# Patient Record
Sex: FEMALE | Race: WHITE | Employment: UNEMPLOYED | URBAN - METROPOLITAN AREA
[De-identification: names, ages, dates, MRNs, and addresses within clinical notes are randomized per-mention and may not be internally consistent; named-entity substitution may affect disease eponyms.]

---
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
## 2019-03-07 ENCOUNTER — HOSPITAL ENCOUNTER (OUTPATIENT)
Dept: OCCUPATIONAL THERAPY | Age: 19
Setting detail: THERAPIES SERIES
Discharge: HOME | End: 2019-03-07
Payer: COMMERCIAL

## 2019-03-07 PROCEDURE — 97537 COMMUNITY/WORK REINTEGRATION: CPT

## 2019-03-15 ENCOUNTER — HOSPITAL ENCOUNTER (OUTPATIENT)
Dept: OCCUPATIONAL THERAPY | Age: 19
Setting detail: THERAPIES SERIES
Discharge: HOME | End: 2019-03-15
Payer: COMMERCIAL

## 2019-03-15 PROCEDURE — 97537 COMMUNITY/WORK REINTEGRATION: CPT

## 2019-03-29 ENCOUNTER — HOSPITAL ENCOUNTER (OUTPATIENT)
Dept: OCCUPATIONAL THERAPY | Age: 19
Setting detail: THERAPIES SERIES
Discharge: HOME | End: 2019-03-29
Payer: COMMERCIAL

## 2019-03-29 PROCEDURE — 97537 COMMUNITY/WORK REINTEGRATION: CPT

## 2019-04-12 ENCOUNTER — APPOINTMENT (OUTPATIENT)
Dept: OCCUPATIONAL THERAPY | Age: 19
End: 2019-04-12
Payer: COMMERCIAL

## 2019-04-12 ENCOUNTER — HOSPITAL ENCOUNTER (OUTPATIENT)
Dept: OCCUPATIONAL THERAPY | Age: 19
Setting detail: THERAPIES SERIES
Discharge: HOME | End: 2019-04-12
Payer: COMMERCIAL

## 2019-04-12 PROCEDURE — 97537 COMMUNITY/WORK REINTEGRATION: CPT

## 2019-04-12 NOTE — PROGRESS NOTES
Occupational Therapy  Occupational Therapy  Rehabilitation Daily Treatment Note    Maci Ashby  2000   [id number]      4/12/2019  12:05 PM  No current outpatient medications on file. No current facility-administered medications for this encounter. Treatment diagnosis:   Spina Bifida        Subjective:     Objective Observations related to Long Term Goals:    1. Pt will complete driving on primary and secondary roads with no intervention required for steering or braking. She was better able to maintain her kendrick when in straight streets. When going around curves to the right she maintained her kendrick. When taking curves to the left she was demonstrating decreased kendrick positioning as she was cutting the turns short and going on the yellow lines. This session she was able to identify her kendrick positioning and adjust without cues. She began driving in light traffic with a good following distance. She did tend to brake late resulting in heavy braking. She was educated to begin looking ahead and anticipate traffic lights and traffic to begin braking sooner. She only required minimal cues for managing her speed including cues for a school zone. 2.  Pt will complete manueverability course with minimal verbal cues. NA   3. Pt will drive on the expressway with no intervention required for steering or braking. NA   4. Pt will complete safe kendrick changes with no verbal cues or intervention required. She attempted two kendrick changes due to needing to change lanes to turn with cars present. She attempted to change lanes without her turn signal or checking for traffic. She was educated on proper technique for kendrick change. Pt unaware of a blind spot. Next session will demonstrate the blind spot to assist her with understanding and knowing where to look when checking her blind spot. 5.  Pt will complete stop sign procedures and appropriate turn taking with no verbal cues or intervention required. She was able to complete with no cues when no other vehicles present. Required cues for decision making when any vehicles were present at the intersection or approaching the intersection. 6.  Pt will complete unprotected left turns with oncoming traffic with no verbal cues or intervention required. She required cues for the difference between green lights and green arrows when turning left. 7.  Pt will demonstrate safe use of vehicle modifications. Specify equipment if applicable:                                                               Push/rock hand control, spinner knob with auxiliary controls. Time In: 1010    Time Out: 1200    Timed Code Treatment Minutes: 110    Total Treatment Minutes: 110    Treatment/Activity Tolerance: good    Prognosis: good    Patient Requires Follow-up: yes    Plan:   Continue per plan of care:____x_______   Jennifer Schroeder current plan:_________   Discharge:______________    Plan for Next Session: Demonstrate blind spots, attempt kendrick changes, increase traffic as able.       MALAIKA Cuba, CDRS, [facility]   Certified  Rehabilitation Specialist

## 2019-04-26 ENCOUNTER — HOSPITAL ENCOUNTER (OUTPATIENT)
Dept: OCCUPATIONAL THERAPY | Age: 19
Setting detail: THERAPIES SERIES
Discharge: HOME | End: 2019-04-26
Payer: COMMERCIAL

## 2019-04-26 ENCOUNTER — APPOINTMENT (OUTPATIENT)
Dept: OCCUPATIONAL THERAPY | Age: 19
End: 2019-04-26
Payer: COMMERCIAL

## 2019-04-26 PROCEDURE — 97537 COMMUNITY/WORK REINTEGRATION: CPT

## 2019-04-26 NOTE — PROGRESS NOTES
Occupational Therapy  Occupational Therapy  Rehabilitation Daily Treatment Note    Julienne Lesch  2000   [id number]      4/26/2019  12:05 PM  No current outpatient medications on file. No current facility-administered medications for this encounter. Treatment diagnosis:     Spina Bifida    Subjective:     Objective Observations related to Long Term Goals:    1. Pt will complete driving on primary and secondary roads with no intervention required for steering or braking. Pt demonstrated improved kendrick positioning this date as she only required cues x 2 this date. She was improved with managing her speed as she was able to maintain the flow of traffic. She was able slow down appropriately for curves and turns only requiring cues x 2. She began driving in light traffic due to improvement with kendrick positioning. She required cues for following distance. At times when traveling through green lights with vehicles approaching the intersection from the side she would slow down requiring cues to maintain her speed. 2.  Pt will complete manueverability course with minimal verbal cues. NA   3. Pt will drive on the expressway with no intervention required for steering or braking. NA   4. Pt will complete safe kendrick changes with no verbal cues or intervention required. Demonstrated her blind spot when in the parking lot before driving. She was then instructed on proper technique to change lanes including checking her blind spot. She completed one kendrick change with max cues. 5.  Pt will complete stop sign procedures and appropriate turn taking with no verbal cues or intervention required. She required cues for turn taking when other vehicles were present at the intersection. 6.  Pt will complete unprotected left turns with oncoming traffic with no verbal cues or intervention required. NA   7. Pt will demonstrate safe use of vehicle modifications.  Specify equipment if applicable: Push/rock hand control, spinner knob with auxiliary controls. Time In: 1000    Time Out: 1200    Timed Code Treatment Minutes: 120    Total Treatment Minutes: 120      Treatment/Activity Tolerance:good    Prognosis:good    Patient Requires Follow-up: yes    Plan:   Continue per plan of care:____x_______   Cornelius Stubbs current plan:_________   Discharge:______________    Plan for Next Session: Increase traffic as able, attempt parking.       MALAIKA Hinojosa, CDRS, [facility]   Certified  Rehabilitation Specialist

## 2019-05-02 ENCOUNTER — HOSPITAL ENCOUNTER (OUTPATIENT)
Dept: OCCUPATIONAL THERAPY | Age: 19
Setting detail: THERAPIES SERIES
Discharge: HOME | End: 2019-05-02
Payer: COMMERCIAL

## 2019-05-02 ENCOUNTER — APPOINTMENT (OUTPATIENT)
Dept: OCCUPATIONAL THERAPY | Age: 19
End: 2019-05-02
Payer: COMMERCIAL

## 2019-05-02 PROCEDURE — 97537 COMMUNITY/WORK REINTEGRATION: CPT

## 2019-05-02 NOTE — PROGRESS NOTES
Occupational Therapy  Occupational Therapy  Rehabilitation Daily Treatment Note    Belkis Malone  2000   [id number]      5/2/2019  12:10 PM  No current outpatient medications on file. No current facility-administered medications for this encounter. Treatment diagnosis:   Spina Bifida      Subjective:     Objective Observations related to Long Term Goals:    1. Pt will complete driving on primary and secondary roads with no intervention required for steering or braking. She was able to increase traffic today as she drove on roads with minimal to moderate traffic at times. She reacted appropriately when traveling in traffic and did not require any intervention for braking. She was able to maintain her kendrick most of the time. She did have 2 instances of over correcting her steering requiring intervention to avoid the curb. She was able to maintain her speed throughout. 2.  Pt will complete manueverability course with minimal verbal cues. She attempted parking with max cues to align with the space. She was turning the wheel sharply resulting in sharp turns and pulling onto the lines. When backing out of the spaces she required cues to go slow as she was accelerating and backing up too fast.   3.  Pt will drive on the expressway with no intervention required for steering or braking. NA   4. Pt will complete safe kendrick changes with no verbal cues or intervention required. She attempted one kendrick change to avoid parked cars. She required max cues to complete the kendrick change. 5.  Pt will complete stop sign procedures and appropriate turn taking with no verbal cues or intervention required. She required cues for decision making when other vehicles were present at the intersection. 6.  Pt will complete unprotected left turns with oncoming traffic with no verbal cues or intervention required. She required cues to look for oncoming traffic.   When the traffic passed she continued wait requiring cues to proceed through the intersection. 7.  Pt will demonstrate safe use of vehicle modifications. Specify equipment if applicable:                                                               Push/rock hand control, spinner knob with auxiliary controls. Time In: 1005    Time Out: 1205    Timed Code Treatment Minutes: 120    Total Treatment Minutes: 120    Treatment/Activity Tolerance: good    Prognosis: good    Patient Requires Follow-up: yes    Plan:   Continue per plan of care:____x_______   Godinez Cools current plan:_________   Discharge:______________    Plan for Next Session: Parking, kendrick changes.     MALAIKA Norman, CDRS, [address] [facility]   Certified  Rehabilitation Specialist

## 2019-05-09 ENCOUNTER — APPOINTMENT (OUTPATIENT)
Dept: OCCUPATIONAL THERAPY | Age: 19
End: 2019-05-09
Payer: COMMERCIAL

## 2019-05-10 ENCOUNTER — HOSPITAL ENCOUNTER (OUTPATIENT)
Dept: OCCUPATIONAL THERAPY | Age: 19
Setting detail: THERAPIES SERIES
Discharge: HOME | End: 2019-05-10
Payer: COMMERCIAL

## 2019-05-10 PROCEDURE — 97537 COMMUNITY/WORK REINTEGRATION: CPT

## 2019-05-10 NOTE — PROGRESS NOTES
Occupational Therapy  Occupational Therapy  Rehabilitation Daily Treatment Note    Magdalena Pugh  2000   [id number]      5/10/2019  12:01 PM  No current outpatient medications on file. No current facility-administered medications for this encounter. Diagnosis: Spina Bifida      Subjective: Pt was asking if her parents would be able to observe her driving. Objective Observations related to Long Term Goals:    1. Pt will complete driving on primary and secondary roads with no intervention required for steering or braking. She began driving from the hospital.  She was able to increase traffic. She required frequent cues for scanning her environment. She required cues to stop at a light that was turning yellow. She reported she did not see the light turn yellow. She also required cues to avoid garbage cans that were hanging out into the street. She required cues for speed throughout. She was braking at times when not needed requiring cues. Pt educated on decreasing unnecessary braking as it may lead to an accident. She was better able to maintain her kendrick this date as she only required a cue x 1 for kendrick positioning. 2.  Pt will complete manueverability course with minimal verbal cues. Parked in multiple parking spots requiring max cues for kendrick positioning and for technique. 3.  Pt will drive on the expressway with no intervention required for steering or braking. NA   4. Pt will complete safe kendrick changes with no verbal cues or intervention required. She completed multiple kendrick changes with max cues for technique. She also required assist with steering as she was steering out of her kendrick when looking in her side mirrors. 5.  Pt will complete stop sign procedures and appropriate turn taking with no verbal cues or intervention required. She required cues at stop signs when other vehicles were present for turn taking.   If she was unable to see down the road she was attempting to move forward to increase her vision. She was educated this is only needed when there are not 4 way stops at the intersection. 6.  Pt will complete unprotected left turns with oncoming traffic with no verbal cues or intervention required. NA   7. Pt will demonstrate safe use of vehicle modifications.  Specify equipment if applicable:                                                               Push/rock hand control, spinner knob with auxiliary controls     Assessment:     Time In: 1010    Time Out: 1155    Timed Code Treatment Minutes: 105    Total Treatment Minutes: 105    Treatment/Activity Tolerance: good    Prognosis: good    Patient Requires Follow-up: yes    Plan:   Continue per plan of care:_____x______   Charlotte Teran current plan:_________   Discharge:______________    Plan for Next Session: continue with kendrick changes    HUGH Quinonez/LOI, [facility], [facility]   Certified  Rehabilitation Specialist

## 2019-05-16 ENCOUNTER — APPOINTMENT (OUTPATIENT)
Dept: OCCUPATIONAL THERAPY | Age: 19
End: 2019-05-16
Payer: COMMERCIAL

## 2019-05-23 ENCOUNTER — HOSPITAL ENCOUNTER (OUTPATIENT)
Dept: OCCUPATIONAL THERAPY | Age: 19
Setting detail: THERAPIES SERIES
Discharge: HOME | End: 2019-05-23
Payer: COMMERCIAL

## 2019-05-23 PROCEDURE — 97537 COMMUNITY/WORK REINTEGRATION: CPT

## 2019-05-23 NOTE — PROGRESS NOTES
Occupational Therapy  Occupational Therapy  Rehabilitation Daily Treatment Note    Dasha Bernard  2000   [id number]      5/23/2019  12:16 PM  No current outpatient medications on file. No current facility-administered medications for this encounter. Diagnosis: Spina Bifida      Subjective: Pt reports being finished with school and is asking if she can come twice a week. Objective Observations related to Long Term Goals:    1. Pt will complete driving on primary and secondary roads with no intervention required for steering or braking. She began driving in minimal traffic and progressed to moderate traffic. Pt became more anxious with increased traffic so then progressed back to less traffic. Discussed we will continue to increase her traffic as able. She required frequent cues throughout for kendrick positioning as she was driving toward the right side of the lanes. She appeared to be up high enough to see over the steering wheel as she was sitting on her new cushion. She was beginning to brake late resulting in heavy braking at times. She was educated on beginning to brake sooner in traffic. She was frequently using the brake to control her speed even when braking was not necessary. Pt educated on letting the vehicle coast to avoid unnecessary braking with sudden changes in speed to avoid accidents. 2.  Pt will complete manueverability course with minimal verbal cues. Completed the forward portion of the course only this date. Concentrated on maneuvering the vehicle in smaller spaces with controlling her steering and speed. She hit the cone to the left as she was attempting to turn too wide to steer to the side of the point cone. She was able to  her back bumper but required assist with the front bumper. 3.  Pt will drive on the expressway with no intervention required for steering or braking. NA   4.   Pt will complete safe kendrick changes with no verbal cues or intervention required. She completed with max cues in minimal traffic. 5.  Pt will complete stop sign procedures and appropriate turn taking with no verbal cues or intervention required. She required cues at the intersections for turn taking. 6.  Pt will complete unprotected left turns with oncoming traffic with no verbal cues or intervention required. Every left turn she made she was coming to a complete stop even when not required and looking in all directions for traffic. Pt educated on looking for oncoming traffic only as the other directions have to yield when she has a green light. 7.  Pt will demonstrate safe use of vehicle modifications. Specify equipment if applicable:                                                               Push/rock hand control, spinner knob with aux controls. Assessment:  She would like to complete her training prior to leaving for college. She was given permission to attempt training twice a week in attempt to complete her training before leaving out of town for college.     Time In: 1000    Time Out: 1200    Timed Code Treatment Minutes: 120    Total Treatment Minutes: 120    Treatment/Activity Tolerance: good    Prognosis: good    Patient Requires Follow-up: yes    Plan:   Continue per plan of care:____x_______   Sonya Jiménez current plan:_________   Discharge:______________    Plan for Next Session: Start with kendrick changes, parking, increase traffic as able    HUGH El/LOI, [facility] [facility]   Certified  Rehabilitation Specialist

## 2019-05-30 ENCOUNTER — HOSPITAL ENCOUNTER (OUTPATIENT)
Dept: OCCUPATIONAL THERAPY | Age: 19
Setting detail: THERAPIES SERIES
Discharge: HOME | End: 2019-05-30
Payer: COMMERCIAL

## 2019-05-30 PROCEDURE — 97537 COMMUNITY/WORK REINTEGRATION: CPT

## 2019-05-30 NOTE — PROGRESS NOTES
Occupational Therapy  Occupational Therapy  Rehabilitation Daily Treatment Note    Dasha Bernard  2000   [id number]      5/30/2019  12:06 PM  No current outpatient medications on file. No current facility-administered medications for this encounter. Diagnosis: Spina Bifida      Subjective: Pt stating her mom would like to know if she can get the controls installed in her car to practice. They are concerned she will not be able to drive before starting college. Objective Observations related to Long Term Goals:    1. Pt will complete driving on primary and secondary roads with no intervention required for steering or braking. Drove on primary roads with light to moderate traffic at times. She continues to begin braking late requiring cues. After multiple cues she began to improve with starting to brake sooner. She also initially was stopping past the white lines at intersections requiring cues. She did improve with this after the first cue. She was taking curves too fast requiring cues. She was educated to watch for yellow warning signs to warn her of sharp curves. She was improved with kendrick positioning this date as she was able to adjust without cues. 2.  Pt will complete manueverability course with minimal verbal cues. When parking in an empty parking lot she required cues as she was straddling a line. She required cues for backing up and to adjust.     3.  Pt will drive on the expressway with no intervention required for steering or braking. Na   4. Pt will complete safe kendrick changes with no verbal cues or intervention required. When completing kendrick changes initially she required max cues for procedures. She was then checking her mirrors but not her blind spot requiring education. After multiple kendrick changes she did check her blind spot x 1 for a kendrick change.    5.  Pt will complete stop sign procedures and appropriate turn taking with no verbal cues or intervention required. She required cues for decision making when other vehicles were present at the intersection. 6.  Pt will complete unprotected left turns with oncoming traffic with no verbal cues or intervention required. She was able to complete left turns with green turn arrows but required cues as she was going to attempt to turn with oncoming traffic. 7.  Pt will demonstrate safe use of vehicle modifications. Specify equipment if applicable:                                                               Push/rock hand control, spinnerknob with auxiliary controls. Assessment: Will discuss with her mother having controls installed as she is not requiring intervention for braking or steering. She responds well to verbal cues.     Time In: 1010    Time Out: 1200    Timed Code Treatment Minutes: 110    Total Treatment Minutes: 110    Treatment/Activity Tolerance: good    Prognosis: good    Patient Requires Follow-up: yes    Plan:   Continue per plan of care:_____x______   Emanuel Boss current plan:_________   Discharge:______________    Plan for Next Session: Continue kendrick changes, introduce maneuverability      HUGH Medellin/L, CDRS, LDI   Certified  Rehabilitation Specialist

## 2019-05-31 NOTE — PROGRESS NOTES
Occupational Therapy    Spoke with pt's mother about progressing  training and having controls installed in her vehicle. She will have to leave [facility] beginning of August.  Discussed this may not be a realistic time frame he she is progressing slowly and also has to build skills that other children may have already started developing such as steering a wheel and driving golf carts, bikes. Discussed risk of spending money to have controls installed and then having to stop due to going to college. They will discuss as a family if they want to continue to pursue driving as she may not have enough time. I do feel pt is ok to drive with her parents as braking assist is not needed with training. Will attempt driving with drop down turn signal next session to help save money on install and if she can operate the controls will write a prescription for controls if she wants to continue with training at this time.       MALAIKA Spencer, CDRS, [address] [facility]   Certified  Rehabilitation Specialist

## 2019-06-03 ENCOUNTER — HOSPITAL ENCOUNTER (OUTPATIENT)
Dept: OCCUPATIONAL THERAPY | Age: 19
Setting detail: THERAPIES SERIES
Discharge: HOME | End: 2019-06-03
Payer: COMMERCIAL

## 2019-06-03 PROCEDURE — 97537 COMMUNITY/WORK REINTEGRATION: CPT

## 2019-06-03 NOTE — PROGRESS NOTES
Occupational Therapy  Occupational Therapy  Rehabilitation Daily Treatment Note    Malaika Mota  2000   [id number]      6/3/2019  12:08 PM  No current outpatient medications on file. No current facility-administered medications for this encounter. Diagnosis: Spina Bifida      Subjective:     Objective Observations related to Long Term Goals:    1. Pt will complete driving on primary and secondary roads with no intervention required for steering or braking. She was able to drive in moderate traffic this date. She was able to stop appropriately for traffic. On two occassions she was not braking when a traffic light was turning yellow requiring cues to slow down to stop at the intersection. She demonstrated decreased kendrick positioning as she was driving toward the right side of the kendrick requiring cues. Noted pt's arm was dropping resulting in steering to the right. Pt cued to attempt to keep the wheel straight. She was able to operate the drop down turn signal with initial cues to turn on her signal before having to begin braking. 2.  Pt will complete manueverability course with minimal verbal cues. Introduced to maneuverability course. Only practiced to the left this date after this writer demonstrated how to complete the course. Pt required max cues and steering assist to complete the course. 3.  Pt will drive on the expressway with no intervention required for steering or braking. NA   4. Pt will complete safe kendrick changes with no verbal cues or intervention required. Completed one kendrick change this date with max cues to complete. 5.  Pt will complete stop sign procedures and appropriate turn taking with no verbal cues or intervention required. She required cues to scoot forward at the intersection due to not having clear sight of oncoming traffic. 6.  Pt will complete unprotected left turns with oncoming traffic with no verbal cues or intervention required. NA   7.   Pt will demonstrate safe use of vehicle modifications. Specify equipment if applicable:                                                               Push/rock hand control, spinner knob mounted at 2 o'clock, turn signal extender. Assessment: She was able to safely operate the drop down turn signal.  Feel she would be safe to begin driving on lightly populated roads with her parents. Will discuss having her vehicle modified to begin practicing with her parents. Time In: 1010    Time Out: 1200    Timed Code Treatment Minutes: 110    Total Treatment Minutes: 110    Treatment/Activity Tolerance: good    Prognosis: good    Patient Requires Follow-up: yes    Plan:   Continue per plan of care:_____x______   Coral Page current plan:_________   Discharge:______________    Plan for Next Session: Continue kendrick changes, maneuverability.       MALAIKA Melo, CDRS, [address] [facility]   Certified  Rehabilitation Specialist

## 2019-06-04 NOTE — PROGRESS NOTES
VEHICLE MODIFICATION AND ADAPTIVE EQUIPMENT PRESCRIPTION                         FOR DRIVING AND/OR TRANSPORTATION        Date: 6/4/19    Name: Bernard Lopes      Vehicle To Be Modified: Unsure    Mobility Aids:  Wheelchair    In order for client to operate the vehicle and be a safe , client requires:    1.) Spinner knob mounted as close to 4 oclock as possible    2.) Drop down turn signal to be operated with hand control in neutral    3.)  [place] Push/rock hand control mounted left side    This prescription has been written after an objective clinical evaluation. Any changes or substitution made deviating from this prescription releases author from liability.           Aquiles Berger, [facility], [facility]   Certified  Rehabilitation Specialist

## 2019-06-06 ENCOUNTER — HOSPITAL ENCOUNTER (OUTPATIENT)
Dept: OCCUPATIONAL THERAPY | Age: 19
Setting detail: THERAPIES SERIES
Discharge: HOME | End: 2019-06-06
Payer: COMMERCIAL

## 2019-06-06 PROCEDURE — 97537 COMMUNITY/WORK REINTEGRATION: CPT

## 2019-06-06 NOTE — PROGRESS NOTES
Occupational Therapy  Occupational Therapy  Rehabilitation Daily Treatment Note    Jessica Jimenez  2000   [id number]      6/6/2019  11:29 AM  No current outpatient medications on file. No current facility-administered medications for this encounter. Diagnosis: Spina Bifida      Subjective:     Objective Observations related to Long Term Goals:    1. Pt will complete driving on primary and secondary roads with no intervention required for steering or braking. Pt drove on primary roads with moderate traffic. Pt demonstrated difficulty with attending to all stimulus presented on the road. She required intervention to prevent from running a red light. Unsure if she saw the light and didn't react or if she did not scan to see the light. Pt does not verbalize much so was not able to determine cause of almost running a red light. She required prompting to turn on red at every intersection. She was taking turns too fast requiring intervention to take turns at an appropriate speed. She did maintain her kendrick better this AM as she only required intervention x 1. Discussed with her mom lacking pre driving skills and two months would not be enough time to get her license and be able to drive safely. She also appears to be lacking basic knowledge of procedures and laws as she has not participated in 's education classes. Recommended taking online courses to increase base of knowledge as she is not able to take in classroom education due to being over the age of 24.   2.  Pt will complete manueverability course with minimal verbal cues. Drove forward requiring cues for steering to make the vehicle parallel with the course. Also demonstrated difficulty with judging the back bumper. When attempting in reverse she demonstrated difficulty with motor planning. Also when instructed to turn when seeing the cones in the mirror she did not initiate the turns requiring cues.    3.  Pt will drive on the expressway with no intervention required for steering or braking. NA   4. Pt will complete safe kendrick changes with no verbal cues or intervention required. Completed kendrick changes with no traffic this date with moderate cues. Pt was improved with checking her mirrors and blind spot before changing lanes. 5.  Pt will complete stop sign procedures and appropriate turn taking with no verbal cues or intervention required. Completed with no cues. 6.  Pt will complete unprotected left turns with oncoming traffic with no verbal cues or intervention required. She required cues for judging proper distances as if any vehicles were approaching she would not attempt to turn. She also required cues when she had a turn arrow as she was not looking at the traffic light. Pt educated on scanning to see traffic lights and road signs. 7.  Pt will demonstrate safe use of vehicle modifications. Specify equipment if applicable:                                                               Push/rock hand control, drop down turn signal, spinner knob mounted at 2 o'clock. Assessment: Pt is lacking pre driving skills including knowledge of procedures. Recommend participating in  classroom training. Discussed need to develop these skills with her mom and two months would not be an attainable goal.  Family will discuss if they want to continue training as she may be able to come home on Fridays and attend training classes and drive with her parents on the weekend. Her mom will call back to advise if they decided to stop training or if they will try to continue and drive with her when she is home from school. Family advised they will need to be able to be consistent with driving to develop proper skills. Pt to enroll in online course for 's education and will attempt drive focus melonie at home.   Also may attempt to drive a golf cart on their property with assist to operate the pedals to assist her with developing

## 2019-06-13 ENCOUNTER — APPOINTMENT (OUTPATIENT)
Dept: OCCUPATIONAL THERAPY | Age: 19
End: 2019-06-13
Payer: COMMERCIAL

## 2019-06-20 ENCOUNTER — HOSPITAL ENCOUNTER (OUTPATIENT)
Dept: OCCUPATIONAL THERAPY | Age: 19
Setting detail: THERAPIES SERIES
Discharge: HOME | End: 2019-06-20
Payer: COMMERCIAL

## 2019-06-20 PROCEDURE — 97537 COMMUNITY/WORK REINTEGRATION: CPT

## 2019-06-20 NOTE — PROGRESS NOTES
Occupational Therapy  Occupational Therapy  Rehabilitation Daily Treatment Note    Fawn Median  2000   [id number]      6/20/2019  2:45 PM  No current outpatient medications on file. No current facility-administered medications for this encounter. Todd Hubbard bifida  Treatment diagnosis:  Unable to drive with feet     Insurance/Certification Information:  Physician Information:    Subjective: Stated she has a new ipad and is completing [employer]. Leaving for vacation this Sunday and will be gone for 2 weeks. Objective Observations related to Long Term Goals:    1. Pt will complete driving on primary and secondary roads with no intervention required for steering or braking. She demonstrated better anticipation for braking. Good kendrick positioning. Discussed scanning the environment prior to turning to make sure it is clear and then scanning for oncoming traffic. She responded well to that and was able to complete without assist by the end of the session. She had one instance of needing intervention when she tried to turn down a kendrick in a parking lot where an oncoming car was in the kendrick. Discussed increasing scanning in parking lot and she verbalized/demo'd understanding. 2.  Pt will complete manueverability course with minimal verbal cues. Practiced parking in parking lot with verbal cues for technique. She was able to identify when she needed to adjust to straighten the car and when she was parked too close on one side. Improved with practice. 3.  Pt will drive on the expressway with no intervention required for steering or braking. Drove on a short section of [facility] with very min traffic and speed up to 60 mph. OT provided step by step cues for merging, she followed cues though no traffic was present to adjust to. 4.  Pt will complete safe kendrick changes with no verbal cues or intervention required. Some cues needed but followed all steps.   Only completed in min traffic areas this date. 5.  Pt will complete stop sign procedures and appropriate turn taking with no verbal cues or intervention required. Improved with anticipation and scanning the environment prior to advancing. Needs reinforcing. 6.  Pt will complete unprotected left turns with oncoming traffic with no verbal cues or intervention required. Improved with anticipation and scanning the environment prior to advancing. Needs reinforcing. 7.  Pt will demonstrate safe use of vehicle modifications. Specify equipment if applicable:                                                               Push/rock hand control, drop down turn signal extender, spinner knob at 2 o'clock. Assessment: Overall improving in driving performance. She needs tasks broken down into small steps and benefited from OT taking her to low traffic areas to work on one skill at a time before adding more traffic. Repetition is also assisting. She is taking steps outside of sessions to work on identifying critical information in the driving environment and would benefit from continued relaying of those specific skills to the road in as controlled an environment as possible. Time In: 1215    Time Out: 1415    Timed Code Treatment Minutes: 120    Total Treatment Minutes:120    Treatment/Activity Tolerance:good    Prognosis:good    Patient Requires Follow-up:yes    Plan:   Continue per plan of care:__x_________   Regenia Cos current plan:_________   Discharge:______________    Plan for Next Session: Continue with one task at a time training in low traffic. Attempt [place] again in low traffic area. Work on parking in parking lot.   Bishnu Albarado, ROSALIO/LOI, S, [address] [place] [address]  Certified  Rehabilitation Specialist  6/20/2019  2:55 PM

## 2019-07-08 ENCOUNTER — HOSPITAL ENCOUNTER (OUTPATIENT)
Dept: OCCUPATIONAL THERAPY | Age: 19
Setting detail: THERAPIES SERIES
Discharge: HOME | End: 2019-07-08
Payer: COMMERCIAL

## 2019-07-08 PROCEDURE — 97537 COMMUNITY/WORK REINTEGRATION: CPT

## 2019-07-15 ENCOUNTER — HOSPITAL ENCOUNTER (OUTPATIENT)
Dept: OCCUPATIONAL THERAPY | Age: 19
Setting detail: THERAPIES SERIES
Discharge: HOME | End: 2019-07-15
Payer: COMMERCIAL

## 2019-07-15 PROCEDURE — 97537 COMMUNITY/WORK REINTEGRATION: CPT

## 2019-07-15 NOTE — PROGRESS NOTES
Occupational Therapy  Occupational Therapy  Rehabilitation Daily Treatment Note    Princess Ferrer  2000   [id number]      7/15/2019  12:13 PM  No current outpatient medications on file. No current facility-administered medications for this encounter. Diagnosis: Spina Bifida  Treatment diagnosis:   Unable to drive with feet      Subjective: Pt agreed to practicing kendrick changes and unprotected left turns this date. Objective Observations related to Long Term Goals:    1. Pt will complete driving on primary and secondary roads with no intervention required for steering or braking. She traveled in minimal to moderate traffic this date. She demonstrated decreased kendrick positioning at times this date requiring cues for kendrick positioning. She began stopping late in traffic resulting in braking heavily to stop. Pt given cues to begin braking sooner. 2.  Pt will complete manueverability course with minimal verbal cues. When pulling into a parking spot on the right she was turning too sharp requiring cues for positioning in the parking space. 3.  Pt will drive on the expressway with no intervention required for steering or braking. NA   4. Pt will complete safe kendrick changes with no verbal cues or intervention required. She completed kendrick changes with minimal traffic and was able to follow all steps. When in moderate traffic she required cues for decision making. When attempting to change lanes she was repeatedly looking in the side mirror and not attending to traffic in front requiring cues. 5.  Pt will complete stop sign procedures and appropriate turn taking with no verbal cues or intervention required. She completed with no cues this date. 6.  Pt will complete unprotected left turns with oncoming traffic with no verbal cues or intervention required. She completed multiple unprotected left turns.   Initially she required cues at a green light as she attempted to turn in front of

## 2019-07-22 ENCOUNTER — HOSPITAL ENCOUNTER (OUTPATIENT)
Dept: OCCUPATIONAL THERAPY | Age: 19
Setting detail: THERAPIES SERIES
Discharge: HOME | End: 2019-07-22
Payer: COMMERCIAL

## 2019-07-22 PROCEDURE — 97537 COMMUNITY/WORK REINTEGRATION: CPT

## 2019-07-22 NOTE — PROGRESS NOTES
intersection and had a stop sign. 6.  Pt will complete unprotected left turns with oncoming traffic with no verbal cues or intervention required. She attempted to turn left in front of oncoming traffic. There were two lanes approaching and one was a turn kendrick. Pt demonstrated difficulty with distinguishing the kendrick traveling straight requiring intervention. 7.  Pt will demonstrate safe use of vehicle modifications. Specify equipment if applicable:                                                               Push/rock hand control, spinner knob mounted at 2 o'clock, and turn signal extender. Assessment: Pt seems to be more comfortable when able to progress slowly. Will continue to treat until further plans are clear with her  training after going to college. Time In: 1010    Time Out: 1200    Timed Code Treatment Minutes: 110    Total Treatment Minutes: 110    Treatment/Activity Tolerance: good    Prognosis: good    Patient Requires Follow-up: yes    Plan:   Continue per plan of care:_____x______   Reyes Ana current plan:_________   Discharge:______________    Plan for Next Session: Will attempt expressway. Address maneuverability.     MALAIKA Blum, CDRS, [address] [facility]   Certified  Rehabilitation Specialist

## 2019-07-29 ENCOUNTER — HOSPITAL ENCOUNTER (OUTPATIENT)
Dept: OCCUPATIONAL THERAPY | Age: 19
Setting detail: THERAPIES SERIES
Discharge: HOME | End: 2019-07-29
Payer: COMMERCIAL

## 2019-07-29 PROCEDURE — 97537 COMMUNITY/WORK REINTEGRATION: CPT

## 2019-11-27 ENCOUNTER — HOSPITAL ENCOUNTER (OUTPATIENT)
Dept: OCCUPATIONAL THERAPY | Age: 19
Setting detail: THERAPIES SERIES
Discharge: HOME | End: 2019-11-27
Payer: COMMERCIAL

## 2019-11-27 PROCEDURE — 97537 COMMUNITY/WORK REINTEGRATION: CPT
